# Patient Record
Sex: FEMALE | Race: WHITE | Employment: UNEMPLOYED | URBAN - METROPOLITAN AREA
[De-identification: names, ages, dates, MRNs, and addresses within clinical notes are randomized per-mention and may not be internally consistent; named-entity substitution may affect disease eponyms.]

---

## 2017-01-17 ENCOUNTER — OFFICE VISIT (OUTPATIENT)
Dept: GASTROENTEROLOGY | Facility: CLINIC | Age: 32
End: 2017-01-17

## 2017-01-17 VITALS
HEART RATE: 102 BPM | DIASTOLIC BLOOD PRESSURE: 82 MMHG | WEIGHT: 125.19 LBS | HEIGHT: 65 IN | SYSTOLIC BLOOD PRESSURE: 148 MMHG | BODY MASS INDEX: 20.86 KG/M2

## 2017-01-17 DIAGNOSIS — Z87.19 HISTORY OF IBS: Primary | ICD-10-CM

## 2017-01-17 DIAGNOSIS — Z87.42 HISTORY OF ENDOMETRIOSIS: ICD-10-CM

## 2017-01-17 DIAGNOSIS — F12.90 MARIJUANA USE: ICD-10-CM

## 2017-01-17 DIAGNOSIS — F41.9 ANXIETY: ICD-10-CM

## 2017-01-17 PROCEDURE — 99204 OFFICE O/P NEW MOD 45 MIN: CPT | Performed by: INTERNAL MEDICINE

## 2017-01-17 PROCEDURE — 99212 OFFICE O/P EST SF 10 MIN: CPT | Performed by: INTERNAL MEDICINE

## 2017-01-18 NOTE — H&P
History of present illness: This is a 27-year-old female new to our group who presents stating that she has irritable bowel syndrome. The patient describes having had many years  cramping abdominal pain.   She is vague when she describes her pain and ca This may include but is not excluded to lab testing, colonoscopy and EGD reports, surgical reports, and imaging reports. In the meantime we will start a low Fod[place] diet. I have advised her to taper her marijuana use.   This may be contributing to some of

## 2017-01-18 NOTE — PATIENT INSTRUCTIONS
1.  Get appointment to see an internal medicine doctor, Dr. Manuel Zarco, STEFANI, or Sánchez.     2.  Get records from previous colonoscopies and  upper endoscopies, lab testing including CBC, comprehensive metabolic panel, thyroid function, celiac testing, en

## 2017-01-18 NOTE — PROGRESS NOTES
HPI:    Patient ID: Nani Ortega is a 28year old female. HPI    Review of Systems   Constitutional: Positive for appetite change and fatigue. Negative for fever, chills, diaphoresis and unexpected weight change. HENT: Positive for rhinorrhea. Exam   Constitutional: She is oriented to person, place, and time. She appears well-developed and well-nourished. No distress. Tearful female but alert and oriented ×3. Mild rhinorrhea. HENT:   Head: Normocephalic and atraumatic.    Mouth/Throat: Oroph SK#[id number]

## 2017-04-18 ENCOUNTER — TELEPHONE (OUTPATIENT)
Dept: GASTROENTEROLOGY | Facility: CLINIC | Age: 32
End: 2017-04-18

## 2017-04-18 NOTE — TELEPHONE ENCOUNTER
Medical records from [address] [place] [address] dated September 2013 show operative upper endoscopy and lysis of adhesions with removal of endometrial implants. Report sent for scanning.

## 2022-04-14 ENCOUNTER — OFFICE VISIT (OUTPATIENT)
Dept: OBGYN CLINIC | Facility: CLINIC | Age: 37
End: 2022-04-14
Payer: COMMERCIAL

## 2022-04-14 VITALS
HEART RATE: 107 BPM | WEIGHT: 112.81 LBS | DIASTOLIC BLOOD PRESSURE: 80 MMHG | BODY MASS INDEX: 19 KG/M2 | SYSTOLIC BLOOD PRESSURE: 117 MMHG

## 2022-04-14 DIAGNOSIS — N92.1 MENORRHAGIA WITH IRREGULAR CYCLE: ICD-10-CM

## 2022-04-14 DIAGNOSIS — Z87.42 HISTORY OF ENDOMETRIOSIS: ICD-10-CM

## 2022-04-14 DIAGNOSIS — G89.29 CHRONIC PELVIC PAIN IN FEMALE: Primary | ICD-10-CM

## 2022-04-14 DIAGNOSIS — N94.10 DYSPAREUNIA, FEMALE: ICD-10-CM

## 2022-04-14 DIAGNOSIS — R10.2 CHRONIC PELVIC PAIN IN FEMALE: Primary | ICD-10-CM

## 2022-04-14 PROCEDURE — 3079F DIAST BP 80-89 MM HG: CPT | Performed by: OBSTETRICS & GYNECOLOGY

## 2022-04-14 PROCEDURE — 99204 OFFICE O/P NEW MOD 45 MIN: CPT | Performed by: OBSTETRICS & GYNECOLOGY

## 2022-04-14 PROCEDURE — 3074F SYST BP LT 130 MM HG: CPT | Performed by: OBSTETRICS & GYNECOLOGY

## 2022-04-14 RX ORDER — METOPROLOL SUCCINATE 50 MG/1
TABLET, EXTENDED RELEASE ORAL
COMMUNITY
Start: 2022-04-11

## 2022-04-14 RX ORDER — NORETHINDRONE ACETATE AND ETHINYL ESTRADIOL AND FERROUS FUMARATE 1MG-20(24)
1 KIT ORAL DAILY
Qty: 84 TABLET | Refills: 1 | Status: SHIPPED | OUTPATIENT
Start: 2022-04-14

## 2022-04-18 ENCOUNTER — TELEPHONE (OUTPATIENT)
Dept: OBGYN CLINIC | Facility: CLINIC | Age: 37
End: 2022-04-18

## 2022-04-18 NOTE — TELEPHONE ENCOUNTER
Pt states she called pharmacy and they indicated there was no RX for her. RN called and spoke to Minerva Zuluaga at [facility] indicated that PA was needed for Medication and would fax over PA with alternative to the office. Fax number given. Pt called and informed that PA was needed for OCP and/or list of alternative medications, pt declined PA or alternative. Pt states she will use [facility] card instead. Called and spoke to Minerva Zuluaga at pharmacy, informed pt will use [facility] card. States understanding.

## 2022-05-16 ENCOUNTER — HOSPITAL ENCOUNTER (OUTPATIENT)
Dept: ULTRASOUND IMAGING | Facility: HOSPITAL | Age: 37
Discharge: HOME | End: 2022-05-16
Attending: OBSTETRICS & GYNECOLOGY
Payer: COMMERCIAL

## 2022-05-16 DIAGNOSIS — Z87.42 HISTORY OF ENDOMETRIOSIS: ICD-10-CM

## 2022-05-16 DIAGNOSIS — R10.2 CHRONIC PELVIC PAIN IN FEMALE: ICD-10-CM

## 2022-05-16 DIAGNOSIS — G89.29 CHRONIC PELVIC PAIN IN FEMALE: ICD-10-CM

## 2022-05-16 DIAGNOSIS — N92.1 MENORRHAGIA WITH IRREGULAR CYCLE: ICD-10-CM

## 2022-05-16 DIAGNOSIS — N94.10 DYSPAREUNIA, FEMALE: ICD-10-CM

## 2022-05-16 PROCEDURE — 76856 US EXAM PELVIC COMPLETE: CPT | Performed by: OBSTETRICS & GYNECOLOGY

## 2022-05-16 PROCEDURE — 76830 TRANSVAGINAL US NON-OB: CPT | Performed by: OBSTETRICS & GYNECOLOGY

## 2022-09-08 RX ORDER — NORETHINDRONE ACETATE AND ETHINYL ESTRADIOL AND FERROUS FUMARATE 1MG-20(24)
KIT ORAL
Refills: 0 | OUTPATIENT
Start: 2022-09-08

## (undated) NOTE — MR AVS SNAPSHOT
[facility] (formerly [facility])  [address] [place] [address] [phone]               Thank you for choosing us for your health care visit with Heavenly Garcia MD.  We are glad to serve you and happy to provide you with this summary of yo Chew 1 tablet by mouth daily. MyChart     Sign up for [email or web address], your secure online medical record. [employer] will allow you to access patient instructions from your recent visit,  view other health information, and more.  To sign up or find